# Patient Record
Sex: FEMALE | Race: WHITE | NOT HISPANIC OR LATINO | URBAN - METROPOLITAN AREA
[De-identification: names, ages, dates, MRNs, and addresses within clinical notes are randomized per-mention and may not be internally consistent; named-entity substitution may affect disease eponyms.]

---

## 2022-01-19 ENCOUNTER — APPOINTMENT (RX ONLY)
Dept: URBAN - METROPOLITAN AREA CLINIC 78 | Facility: CLINIC | Age: 67
Setting detail: DERMATOLOGY
End: 2022-01-19

## 2022-01-19 DIAGNOSIS — E88.1 LIPODYSTROPHY, NOT ELSEWHERE CLASSIFIED: ICD-10-CM

## 2022-01-19 DIAGNOSIS — L70.0 ACNE VULGARIS: ICD-10-CM

## 2022-01-19 PROCEDURE — ? COSMETIC CONSULTATION: FILLERS

## 2022-01-19 PROCEDURE — 99203 OFFICE O/P NEW LOW 30 MIN: CPT

## 2022-01-19 PROCEDURE — ? TREATMENT REGIMEN

## 2022-01-19 PROCEDURE — ? COUNSELING

## 2022-01-19 PROCEDURE — ? PHOTO-DOCUMENTATION

## 2022-01-19 PROCEDURE — ? PRESCRIPTION

## 2022-01-19 RX ORDER — CLINDAMYCIN PHOSPHATE AND BENZOYL PEROXIDE 10; 50 MG/G; MG/G
GEL TOPICAL BID
Qty: 25 | Refills: 2 | Status: ERX | COMMUNITY
Start: 2022-01-19

## 2022-01-19 RX ADMIN — CLINDAMYCIN PHOSPHATE AND BENZOYL PEROXIDE: 10; 50 GEL TOPICAL at 00:00

## 2022-01-19 ASSESSMENT — LOCATION SIMPLE DESCRIPTION DERM
LOCATION SIMPLE: LEFT CHEEK
LOCATION SIMPLE: RIGHT CHEEK

## 2022-01-19 ASSESSMENT — LOCATION ZONE DERM: LOCATION ZONE: FACE

## 2022-01-19 ASSESSMENT — LOCATION DETAILED DESCRIPTION DERM
LOCATION DETAILED: LEFT SUPERIOR CENTRAL BUCCAL CHEEK
LOCATION DETAILED: RIGHT SUPERIOR LATERAL BUCCAL CHEEK

## 2022-01-19 ASSESSMENT — SEVERITY ASSESSMENT OVERALL AMONG ALL PATIENTS
IN YOUR EXPERIENCE, AMONG ALL PATIENTS YOU HAVE SEEN WITH THIS CONDITION, HOW SEVERE IS THIS PATIENT'S CONDITION?: FEW INFLAMMATORY LESIONS, SOME NONINFLAMMATORY

## 2022-01-19 NOTE — PROCEDURE: COUNSELING
Topical Sulfur Applications Pregnancy And Lactation Text: This medication is Pregnancy Category C and has an unknown safety profile during pregnancy. It is unknown if this topical medication is excreted in breast milk.
Azithromycin Pregnancy And Lactation Text: This medication is considered safe during pregnancy and is also secreted in breast milk.
Benzoyl Peroxide Counseling: Patient counseled that medicine may cause skin irritation and bleach clothing.  In the event of skin irritation, the patient was advised to reduce the amount of the drug applied or use it less frequently.   The patient verbalized understanding of the proper use and possible adverse effects of benzoyl peroxide.  All of the patient's questions and concerns were addressed.
High Dose Vitamin A Pregnancy And Lactation Text: High dose vitamin A therapy is contraindicated during pregnancy and breast feeding.
Tazorac Pregnancy And Lactation Text: This medication is not safe during pregnancy. It is unknown if this medication is excreted in breast milk.
Tazorac Counseling:  Patient advised that medication is irritating and drying.  Patient may need to apply sparingly and wash off after an hour before eventually leaving it on overnight.  The patient verbalized understanding of the proper use and possible adverse effects of tazorac.  All of the patient's questions and concerns were addressed.
High Dose Vitamin A Counseling: Side effects reviewed, pt to contact office should one occur.
Sarecycline Pregnancy And Lactation Text: This medication is Pregnancy Category D and not consider safe during pregnancy. It is also excreted in breast milk.
Erythromycin Counseling:  I discussed with the patient the risks of erythromycin including but not limited to GI upset, allergic reaction, drug rash, diarrhea, increase in liver enzymes, and yeast infections.
Use Enhanced Medication Counseling?: No
Minocycline Counseling: Patient advised regarding possible photosensitivity and discoloration of the teeth, skin, lips, tongue and gums.  Patient instructed to avoid sunlight, if possible.  When exposed to sunlight, patients should wear protective clothing, sunglasses, and sunscreen.  The patient was instructed to call the office immediately if the following severe adverse effects occur:  hearing changes, easy bruising/bleeding, severe headache, or vision changes.  The patient verbalized understanding of the proper use and possible adverse effects of minocycline.  All of the patient's questions and concerns were addressed.
Benzoyl Peroxide Pregnancy And Lactation Text: This medication is Pregnancy Category C. It is unknown if benzoyl peroxide is excreted in breast milk.
Topical Clindamycin Counseling: Patient counseled that this medication may cause skin irritation or allergic reactions.  In the event of skin irritation, the patient was advised to reduce the amount of the drug applied or use it less frequently.   The patient verbalized understanding of the proper use and possible adverse effects of clindamycin.  All of the patient's questions and concerns were addressed.
Dapsone Pregnancy And Lactation Text: This medication is Pregnancy Category C and is not considered safe during pregnancy or breast feeding.
Spironolactone Pregnancy And Lactation Text: This medication can cause feminization of the male fetus and should be avoided during pregnancy. The active metabolite is also found in breast milk.
Spironolactone Counseling: Patient advised regarding risks of diarrhea, abdominal pain, hyperkalemia, birth defects (for female patients), liver toxicity and renal toxicity. The patient may need blood work to monitor liver and kidney function and potassium levels while on therapy. The patient verbalized understanding of the proper use and possible adverse effects of spironolactone.  All of the patient's questions and concerns were addressed.
Erythromycin Pregnancy And Lactation Text: This medication is Pregnancy Category B and is considered safe during pregnancy. It is also excreted in breast milk.
Dapsone Counseling: I discussed with the patient the risks of dapsone including but not limited to hemolytic anemia, agranulocytosis, rashes, methemoglobinemia, kidney failure, peripheral neuropathy, headaches, GI upset, and liver toxicity.  Patients who start dapsone require monitoring including baseline LFTs and weekly CBCs for the first month, then every month thereafter.  The patient verbalized understanding of the proper use and possible adverse effects of dapsone.  All of the patient's questions and concerns were addressed.
Winlevi Counseling:  I discussed with the patient the risks of topical clascoterone including but not limited to erythema, scaling, itching, and stinging. Patient voiced their understanding.
Bactrim Counseling:  I discussed with the patient the risks of sulfa antibiotics including but not limited to GI upset, allergic reaction, drug rash, diarrhea, dizziness, photosensitivity, and yeast infections.  Rarely, more serious reactions can occur including but not limited to aplastic anemia, agranulocytosis, methemoglobinemia, blood dyscrasias, liver or kidney failure, lung infiltrates or desquamative/blistering drug rashes.
Doxycycline Counseling:  Patient counseled regarding possible photosensitivity and increased risk for sunburn.  Patient instructed to avoid sunlight, if possible.  When exposed to sunlight, patients should wear protective clothing, sunglasses, and sunscreen.  The patient was instructed to call the office immediately if the following severe adverse effects occur:  hearing changes, easy bruising/bleeding, severe headache, or vision changes.  The patient verbalized understanding of the proper use and possible adverse effects of doxycycline.  All of the patient's questions and concerns were addressed.
Detail Level: Simple
Isotretinoin Pregnancy And Lactation Text: This medication is Pregnancy Category X and is considered extremely dangerous during pregnancy. It is unknown if it is excreted in breast milk.
Topical Clindamycin Pregnancy And Lactation Text: This medication is Pregnancy Category B and is considered safe during pregnancy. It is unknown if it is excreted in breast milk.
Bactrim Pregnancy And Lactation Text: This medication is Pregnancy Category D and is known to cause fetal risk.  It is also excreted in breast milk.
Isotretinoin Counseling: Patient should get monthly blood tests, not donate blood, not drive at night if vision affected, not share medication, and not undergo elective surgery for 6 months after tx completed. Side effects reviewed, pt to contact office should one occur.
Winlevi Pregnancy And Lactation Text: This medication is considered safe during pregnancy and breastfeeding.
Topical Retinoid counseling:  Patient advised to apply a pea-sized amount only at bedtime and wait 30 minutes after washing their face before applying.  If too drying, patient may add a non-comedogenic moisturizer. The patient verbalized understanding of the proper use and possible adverse effects of retinoids.  All of the patient's questions and concerns were addressed.
Azithromycin Counseling:  I discussed with the patient the risks of azithromycin including but not limited to GI upset, allergic reaction, drug rash, diarrhea, and yeast infections.
Doxycycline Pregnancy And Lactation Text: This medication is Pregnancy Category D and not consider safe during pregnancy. It is also excreted in breast milk but is considered safe for shorter treatment courses.
Topical Sulfur Applications Counseling: Topical Sulfur Counseling: Patient counseled that this medication may cause skin irritation or allergic reactions.  In the event of skin irritation, the patient was advised to reduce the amount of the drug applied or use it less frequently.   The patient verbalized understanding of the proper use and possible adverse effects of topical sulfur application.  All of the patient's questions and concerns were addressed.
Birth Control Pills Pregnancy And Lactation Text: This medication should be avoided if pregnant and for the first 30 days post-partum.
Tetracycline Counseling: Patient counseled regarding possible photosensitivity and increased risk for sunburn.  Patient instructed to avoid sunlight, if possible.  When exposed to sunlight, patients should wear protective clothing, sunglasses, and sunscreen.  The patient was instructed to call the office immediately if the following severe adverse effects occur:  hearing changes, easy bruising/bleeding, severe headache, or vision changes.  The patient verbalized understanding of the proper use and possible adverse effects of tetracycline.  All of the patient's questions and concerns were addressed. Patient understands to avoid pregnancy while on therapy due to potential birth defects.
Topical Retinoid Pregnancy And Lactation Text: This medication is Pregnancy Category C. It is unknown if this medication is excreted in breast milk.
Birth Control Pills Counseling: Birth Control Pill Counseling: I discussed with the patient the potential side effects of OCPs including but not limited to increased risk of stroke, heart attack, thrombophlebitis, deep venous thrombosis, hepatic adenomas, breast changes, GI upset, headaches, and depression.  The patient verbalized understanding of the proper use and possible adverse effects of OCPs. All of the patient's questions and concerns were addressed.
Sarecycline Counseling: Patient advised regarding possible photosensitivity and discoloration of the teeth, skin, lips, tongue and gums.  Patient instructed to avoid sunlight, if possible.  When exposed to sunlight, patients should wear protective clothing, sunglasses, and sunscreen.  The patient was instructed to call the office immediately if the following severe adverse effects occur:  hearing changes, easy bruising/bleeding, severe headache, or vision changes.  The patient verbalized understanding of the proper use and possible adverse effects of sarecycline.  All of the patient's questions and concerns were addressed.
Detail Level: Detailed

## 2022-01-24 ENCOUNTER — RX ONLY (OUTPATIENT)
Age: 67
Setting detail: RX ONLY
End: 2022-01-24

## 2022-01-24 RX ORDER — CLINDAMYCIN PHOSPHATE 10 MG/G
GEL TOPICAL
Qty: 30 | Refills: 2 | Status: ERX | COMMUNITY
Start: 2022-01-24

## 2023-12-23 LAB
T4 FREE: 1.83
TSH SERPL DL<=0.05 MIU/L-ACNC: 0.59 UIU/ML

## 2023-12-28 ENCOUNTER — OFFICE VISIT (OUTPATIENT)
Age: 68
End: 2023-12-28
Payer: MEDICARE

## 2023-12-28 VITALS
HEART RATE: 74 BPM | DIASTOLIC BLOOD PRESSURE: 72 MMHG | WEIGHT: 138 LBS | SYSTOLIC BLOOD PRESSURE: 130 MMHG | BODY MASS INDEX: 24.25 KG/M2

## 2023-12-28 DIAGNOSIS — E03.8 OTHER SPECIFIED HYPOTHYROIDISM: Primary | ICD-10-CM

## 2023-12-28 DIAGNOSIS — B36.9 FUNGAL SKIN INFECTION: ICD-10-CM

## 2023-12-28 DIAGNOSIS — R63.5 WEIGHT GAIN: ICD-10-CM

## 2023-12-28 PROCEDURE — 1123F ACP DISCUSS/DSCN MKR DOCD: CPT | Performed by: INTERNAL MEDICINE

## 2023-12-28 PROCEDURE — 99214 OFFICE O/P EST MOD 30 MIN: CPT | Performed by: INTERNAL MEDICINE

## 2023-12-28 RX ORDER — LEVOTHYROXINE SODIUM 0.1 MG/1
TABLET ORAL
Qty: 3090 TABLET | Refills: 2 | Status: SHIPPED | OUTPATIENT
Start: 2023-12-28

## 2023-12-28 NOTE — PROGRESS NOTES
[facility] ENDOCRINOLOGY  [address] [address]  Dept: [phone]  Loc: [phone]     Visit Date: 12/28/2023    Leopold Nail is a 80 y.o. female who presents today for:  Chief Complaint   Patient presents with    Follow-up    Thyroid Problem     hypothyroidism              Subjective:      HPI. Leopold Nail is a 73 y.o. , female who comes for follow-up for hypothyroidism. Referring provider: Ileana Richmond  She was diagnosed with hypothyroidism 2019. This patient was diagnosed with hypothyroidism 4 years ago. She is now taking Synthroid 100 mcg 6 days a week . Labs from last week showed normal thyroid levels. Her main complaint today is weight gain. The patient has not really changed her eating habit. She is fairly active, more so when she is in [place]. She does not have easy bruising of the skin and she has not noticed any stretch marks. She is also had dryness of the skin and some depression. In addition she has had some problems with memory. Patient also reports an itchy rash below the left breast.   Past Medical History:   Diagnosis Date    Hypothyroidism       History reviewed. No pertinent surgical history. History reviewed. No pertinent family history. Social History     Tobacco Use    Smoking status: Never    Smokeless tobacco: Never   Substance Use Topics    Alcohol use:  Yes     Alcohol/week: 4.0 standard drinks of alcohol     Types: 4 Standard drinks or equivalent per week     Comment: social      Current Outpatient Medications   Medication Sig Dispense Refill    levothyroxine (SYNTHROID) 100 MCG tablet Take 1 tablet daily for 6 days and none on 7th day 3090 tablet 2    metoprolol succinate (TOPROL XL) 50 MG extended release tablet Take 1 tablet by mouth daily      Misc Natural Products (JOINT HEALTH PO) Take by mouth      Multiple Vitamin (MULTIVITAMIN ADULT PO) Take by mouth      Saw Palmetto 450 MG

## 2024-01-02 ENCOUNTER — TELEPHONE (OUTPATIENT)
Age: 69
End: 2024-01-02

## 2024-01-02 NOTE — TELEPHONE ENCOUNTER
[facility] pharmacy called in you sent a prescription for the levothyroxine but the quantity is 3,090. Can you fix this and resend it in to [facility] in [place]

## 2024-01-03 DIAGNOSIS — E03.8 OTHER SPECIFIED HYPOTHYROIDISM: ICD-10-CM

## 2024-01-03 RX ORDER — LEVOTHYROXINE SODIUM 0.1 MG/1
TABLET ORAL
Qty: 90 TABLET | Refills: 1 | Status: SHIPPED | OUTPATIENT
Start: 2024-01-03

## 2024-01-12 LAB — CREATINE 24 HOUR URINE: 0.9

## 2024-04-01 LAB
T4 FREE: 1.35
TSH SERPL DL<=0.05 MIU/L-ACNC: 0.55 UIU/ML

## 2024-06-19 ENCOUNTER — TELEPHONE (OUTPATIENT)
Age: 69
End: 2024-06-19

## 2024-06-19 NOTE — TELEPHONE ENCOUNTER
Pt states she went to urgent care and the nurse advised not to take metoprolol while taking levothyroxine. Patient would like to know what you would like to do regarding this. Please advise.

## 2024-06-27 ENCOUNTER — OFFICE VISIT (OUTPATIENT)
Age: 69
End: 2024-06-27
Payer: MEDICARE

## 2024-06-27 VITALS
BODY MASS INDEX: 23.53 KG/M2 | HEIGHT: 63 IN | WEIGHT: 132.8 LBS | DIASTOLIC BLOOD PRESSURE: 82 MMHG | SYSTOLIC BLOOD PRESSURE: 126 MMHG | HEART RATE: 69 BPM

## 2024-06-27 DIAGNOSIS — E03.8 OTHER SPECIFIED HYPOTHYROIDISM: Primary | ICD-10-CM

## 2024-06-27 PROCEDURE — 1123F ACP DISCUSS/DSCN MKR DOCD: CPT | Performed by: INTERNAL MEDICINE

## 2024-06-27 PROCEDURE — 99213 OFFICE O/P EST LOW 20 MIN: CPT | Performed by: INTERNAL MEDICINE

## 2024-06-27 RX ORDER — MAGNESIUM 30 MG
30 TABLET ORAL 2 TIMES DAILY
COMMUNITY

## 2024-06-27 RX ORDER — VITAMIN B COMPLEX
1 CAPSULE ORAL DAILY
COMMUNITY

## 2024-06-27 RX ORDER — LEVOTHYROXINE SODIUM 0.1 MG/1
TABLET ORAL
Qty: 90 TABLET | Refills: 1 | Status: SHIPPED | OUTPATIENT
Start: 2024-06-27

## 2024-06-27 NOTE — PROGRESS NOTES
[facility] [place] [facility]  [facility] ENDOCRINOLOGY  [address]  [facility] [address]  Dept: [phone]  Loc: [phone]     Visit Date: 6/27/2024    Jeimy Liz is a 68 y.o. female who presents today for:  Chief Complaint   Patient presents with    Hypothyroidism              Subjective:      HPI.     Jeimy Liz is a 66 y.o. , female who comes for follow-up for hypothyroidism.  Referring provider: Aaron Kim  She was diagnosed with hypothyroidism 2019.    She is now taking Synthroid 100 mcg 6 days a week .  The patient has not had any labs since 4/1/2024 when both TSH and free T4 were normal.  Today she reports multiple symptoms including cold intolerance, dry skin, depression, fatigue, memory problems and weight gain.  Past Medical History:   Diagnosis Date    Hypothyroidism       History reviewed. No pertinent surgical history.    History reviewed. No pertinent family history.  Social History     Tobacco Use    Smoking status: Never    Smokeless tobacco: Never   Substance Use Topics    Alcohol use: Yes     Alcohol/week: 4.0 standard drinks of alcohol     Types: 4 Standard drinks or equivalent per week     Comment: social      Current Outpatient Medications   Medication Sig Dispense Refill    VITAMIN K PO Take by mouth      magnesium 30 MG tablet Take 1 tablet by mouth 2 times daily      b complex vitamins capsule Take 1 capsule by mouth daily      metoprolol succinate (TOPROL XL) 50 MG extended release tablet Take 1 tablet by mouth daily      Misc Natural Products (JOINT HEALTH PO) Take by mouth      Omega-3 Fatty Acids (FISH OIL) 1000 MG CAPS Take 3 capsules by mouth 3 times daily      Multiple Vitamin (MULTIVITAMIN ADULT PO) Take by mouth      Saw Palmetto 450 MG CAPS Take by mouth      Probiotic Product (PROBIOTIC MULTI-ENZYME PO) Take by mouth      l-arginine 500 MG capsule Take 1 capsule by mouth 3 times daily      levothyroxine (SYNTHROID) 100 MCG tablet

## 2024-08-13 LAB
T4 FREE: 1.84
TSH SERPL DL<=0.05 MIU/L-ACNC: 0.68 UIU/ML

## 2024-08-19 ENCOUNTER — TELEPHONE (OUTPATIENT)
Age: 69
End: 2024-08-19

## 2024-08-19 NOTE — TELEPHONE ENCOUNTER
----- Message from Dr. Miles Lorenzo MD sent at 8/17/2024  3:45 PM EDT -----  Normal thyroid labs.

## 2024-12-30 ENCOUNTER — TELEPHONE (OUTPATIENT)
Age: 69
End: 2024-12-30

## 2024-12-30 ENCOUNTER — OFFICE VISIT (OUTPATIENT)
Age: 69
End: 2024-12-30
Payer: MEDICARE

## 2024-12-30 VITALS
DIASTOLIC BLOOD PRESSURE: 82 MMHG | RESPIRATION RATE: 16 BRPM | BODY MASS INDEX: 24.13 KG/M2 | HEART RATE: 88 BPM | SYSTOLIC BLOOD PRESSURE: 118 MMHG | WEIGHT: 136.2 LBS | HEIGHT: 63 IN

## 2024-12-30 DIAGNOSIS — E03.8 OTHER SPECIFIED HYPOTHYROIDISM: ICD-10-CM

## 2024-12-30 PROCEDURE — 99214 OFFICE O/P EST MOD 30 MIN: CPT | Performed by: INTERNAL MEDICINE

## 2024-12-30 PROCEDURE — 1123F ACP DISCUSS/DSCN MKR DOCD: CPT | Performed by: INTERNAL MEDICINE

## 2024-12-30 PROCEDURE — 1160F RVW MEDS BY RX/DR IN RCRD: CPT | Performed by: INTERNAL MEDICINE

## 2024-12-30 PROCEDURE — 1159F MED LIST DOCD IN RCRD: CPT | Performed by: INTERNAL MEDICINE

## 2024-12-30 RX ORDER — TRETINOIN 0.5 MG/G
1 CREAM TOPICAL DAILY
COMMUNITY
Start: 2024-06-10

## 2024-12-30 RX ORDER — LEVOTHYROXINE SODIUM 100 UG/1
TABLET ORAL
Qty: 90 TABLET | Refills: 1 | Status: SHIPPED | OUTPATIENT
Start: 2024-12-30 | End: 2024-12-30

## 2024-12-30 RX ORDER — VALACYCLOVIR HYDROCHLORIDE 1 G/1
1000 TABLET, FILM COATED ORAL 2 TIMES DAILY PRN
COMMUNITY
Start: 2024-06-10

## 2024-12-30 NOTE — PROGRESS NOTES
levothyroxine daily, six days a week, skipping Sundays  - BMI remains within the normal range at 23  - Repeat free T4 and TSH tests to determine if medication dosage adjustment is necessary  - Advised to contact the office if no communication within a week  - Discussed possibility of switching to a brand formulation of levothyroxine if levels continue to fluctuate    2. Weight gain  - Reports difficulty losing weight despite a low-calorie diet and weight gain since thyroid issues began  - Discussed potential impact of metoprolol on weight gain  - Currently on 25 mg of metoprolol for blood pressure management, reduced from 50 mg  -Has also helped with tachycardia.  - Noted that beta-blockers like metoprolol can contribute to weight gain  - Previously on an ACE inhibitor  -Reassess after correction of thyroid levels.    Follow-up  - Patient to follow up in 6 months       Diagnosis Orders   1. Other specified hypothyroidism            No orders of the defined types were placed in this encounter.        The risks and benefits of my recommendations, as well as other treatment options were discussed with the patient today. Questions were answered.  Follow up: 6 months and as needed.         Electronically signed by Miles Lorenzo MD 12/30/2024 2:31 PM     **This report has been created using voice recognition software. It may   contain minor errors which are inherent in voice recognition technology.**

## 2024-12-30 NOTE — TELEPHONE ENCOUNTER
Pt called in stating she was seen earlier and forgot to mention that she is taking compounded progesterone 175mg once a day.   Patient states she is also using a cream, this cream is Compounded biest 0.25 mg (50/50/testosterone 0.1mg/mL) She states she only apply's half a mL of this.  Pt also thinks she is taking DHEA 5mg

## 2025-01-02 LAB
T4 FREE: 1.45
TSH SERPL DL<=0.05 MIU/L-ACNC: 0.57 UIU/ML

## 2025-01-06 ENCOUNTER — TELEPHONE (OUTPATIENT)
Age: 70
End: 2025-01-06

## 2025-03-03 ENCOUNTER — TELEPHONE (OUTPATIENT)
Age: 70
End: 2025-03-03

## 2025-03-03 NOTE — TELEPHONE ENCOUNTER
Patient called in and would like to change her levothyroxine to 88mcg and see how she does on this. She states she has been having some vision issues and is unsure if this is related.     Patient's pharmacy is [facility] in [place].

## 2025-03-03 NOTE — TELEPHONE ENCOUNTER
Patient called in regarding refill. Called and LVM for patient to call office back as we need more info.

## 2025-03-10 ENCOUNTER — CLINICAL DOCUMENTATION (OUTPATIENT)
Age: 70
End: 2025-03-10

## 2025-03-10 DIAGNOSIS — E03.9 ACQUIRED HYPOTHYROIDISM: Primary | ICD-10-CM

## 2025-03-10 RX ORDER — LEVOTHYROXINE SODIUM 88 UG/1
88 TABLET ORAL DAILY
Qty: 30 TABLET | Refills: 5 | Status: SHIPPED | OUTPATIENT
Start: 2025-03-10

## 2025-03-10 NOTE — TELEPHONE ENCOUNTER
Patient calling again. She states she is going to run out of medication in about a week. Please advise/call patient.

## 2025-03-12 NOTE — TELEPHONE ENCOUNTER
Pt informed and verbalized understanding with no further questions at this time. Labs faxed to [facility].

## 2025-04-09 LAB
T4 FREE: 1.47
TSH SERPL DL<=0.05 MIU/L-ACNC: 1.31 UIU/ML

## 2025-04-11 ENCOUNTER — RESULTS FOLLOW-UP (OUTPATIENT)
Age: 70
End: 2025-04-11

## 2025-04-29 ENCOUNTER — TELEPHONE (OUTPATIENT)
Age: 70
End: 2025-04-29

## 2025-04-29 ENCOUNTER — CLINICAL DOCUMENTATION (OUTPATIENT)
Age: 70
End: 2025-04-29

## 2025-04-29 DIAGNOSIS — E03.9 ACQUIRED HYPOTHYROIDISM: Primary | ICD-10-CM

## 2025-04-29 RX ORDER — LEVOTHYROXINE SODIUM 75 UG/1
75 TABLET ORAL DAILY
Qty: 30 TABLET | Refills: 3 | Status: SHIPPED | OUTPATIENT
Start: 2025-04-29

## 2025-04-29 NOTE — PROGRESS NOTES
Skip Synthroid for 2 days.  Then start taking Synthroid 75 mcg daily.  Then repeat free T4 and TSH in a month.  Ordered.

## 2025-04-29 NOTE — TELEPHONE ENCOUNTER
Patient calling in stating she thinks she needs lower dose of levothyroxine, she states she feels \"on the verge of tears, cold all the time, very fatigued, brain fog\" please advise.

## 2025-04-30 NOTE — TELEPHONE ENCOUNTER
Patient called in stating that she did not want a lower dose of levothyroxine. Patient wants to go to 100 mcg as that was when she felt the best. She is taking 88 now and doesn't want to go lower. She wants to go back to 100 mcg. There must have been a misunderstanding yesterday.

## 2025-05-08 ENCOUNTER — CLINICAL DOCUMENTATION (OUTPATIENT)
Age: 70
End: 2025-05-08

## 2025-05-08 NOTE — PROGRESS NOTES
I spoke with the patient.  She would like to try some T3.  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

## 2025-06-30 ENCOUNTER — HOSPITAL ENCOUNTER (OUTPATIENT)
Dept: INTERVENTIONAL RADIOLOGY/VASCULAR | Age: 70
Discharge: HOME | End: 2025-07-02
Payer: MEDICARE

## 2025-06-30 ENCOUNTER — OFFICE VISIT (OUTPATIENT)
Age: 70
End: 2025-06-30
Payer: MEDICARE

## 2025-06-30 VITALS
WEIGHT: 131.38 LBS | BODY MASS INDEX: 23.28 KG/M2 | HEIGHT: 63 IN | DIASTOLIC BLOOD PRESSURE: 82 MMHG | SYSTOLIC BLOOD PRESSURE: 118 MMHG | HEART RATE: 82 BPM

## 2025-06-30 DIAGNOSIS — M79.89 LEFT LEG SWELLING: ICD-10-CM

## 2025-06-30 DIAGNOSIS — E03.9 ACQUIRED HYPOTHYROIDISM: Primary | ICD-10-CM

## 2025-06-30 DIAGNOSIS — E06.3 HASHIMOTO THYROIDITIS: ICD-10-CM

## 2025-06-30 LAB — ECHO BSA: 1.63 M2

## 2025-06-30 PROCEDURE — 1123F ACP DISCUSS/DSCN MKR DOCD: CPT | Performed by: INTERNAL MEDICINE

## 2025-06-30 PROCEDURE — 1159F MED LIST DOCD IN RCRD: CPT | Performed by: INTERNAL MEDICINE

## 2025-06-30 PROCEDURE — 99214 OFFICE O/P EST MOD 30 MIN: CPT | Performed by: INTERNAL MEDICINE

## 2025-06-30 PROCEDURE — 93971 EXTREMITY STUDY: CPT

## 2025-06-30 RX ORDER — LEVOTHYROXINE SODIUM 88 MCG
88 TABLET ORAL DAILY
Qty: 30 TABLET | Refills: 5 | Status: SHIPPED | OUTPATIENT
Start: 2025-06-30

## 2025-06-30 RX ORDER — LEVOTHYROXINE SODIUM 88 UG/1
88 TABLET ORAL DAILY
COMMUNITY
Start: 2025-06-13 | End: 2025-06-30

## 2025-06-30 NOTE — PROGRESS NOTES
[facility] [place] [facility]  [facility] ENDOCRINOLOGY  [address]  [address]  [place] [address]  Dept: [phone]  Loc: [phone]     Visit Date: 6/30/2025    Jeimy Liz is a 69 y.o. female who presents today for:  Chief Complaint   Patient presents with    Follow-up     Other specified hypothyroidism            Subjective:      HPI   History of Present Illness               Jeimy Liz is a 69 y.o. , female who comes for Follow up for hypothyroidism.  Aaron Kim,   Jeimy Liz was diagnosed with hypothyroidism 10 year(s) ago. Her most recent Thyroid studies show Hyperthyroidism  TSH low at 0.216, Free T4  elevated at 2.02, Free T3 WNL at 3.0, and TPO antibodies mildly elevated at 146 (ref < 101).  Etiology of hypothyroidism is Hashimoto's Disease.   Current therapy includes levothyroxine 88 mcg and 130 mg of desiccated natural thyroid.  Current symptoms include Depression and Brittle Hair. She also has some irritability and hair loss. She also has frequent bowel movements with 3-4 BMs per day that she states as normal for at least the past 10 years.     She started taking The additional desiccated natural thyroid supplement because she was concerned that her depressive symptoms were caused by hypoactive thyroid with lack of peripheral conversion of T4 to T3. She is on metoprolol and states that she is concerned this may be making her feel worse. She has been unable to loss weight, gets fatigues and SOB with exercise, and attributes this to metoprolol. She says that she was previously tachycardic and hypertensive, and that's why she was started on metoprolol. She is interested in discontinuing this medication and will speak to her PCP about switching to another antihypertensive medication. She also notes some leg swelling when asked, and this swelling appears to be more pronounced on the left side.           Past Medical History:   Diagnosis Date    Hypothyroidism

## 2025-08-26 LAB
T4 FREE: 1.82
TSH SERPL DL<=0.05 MIU/L-ACNC: 0.39 UIU/ML